# Patient Record
Sex: MALE | Race: WHITE | NOT HISPANIC OR LATINO | Employment: FULL TIME | URBAN - METROPOLITAN AREA
[De-identification: names, ages, dates, MRNs, and addresses within clinical notes are randomized per-mention and may not be internally consistent; named-entity substitution may affect disease eponyms.]

---

## 2019-11-08 ENCOUNTER — OFFICE VISIT (OUTPATIENT)
Dept: FAMILY MEDICINE CLINIC | Facility: CLINIC | Age: 24
End: 2019-11-08

## 2019-11-08 VITALS
BODY MASS INDEX: 32.1 KG/M2 | SYSTOLIC BLOOD PRESSURE: 120 MMHG | WEIGHT: 237 LBS | HEART RATE: 90 BPM | HEIGHT: 72 IN | OXYGEN SATURATION: 98 % | DIASTOLIC BLOOD PRESSURE: 80 MMHG

## 2019-11-08 DIAGNOSIS — R42 DIZZINESS: ICD-10-CM

## 2019-11-08 DIAGNOSIS — J30.9 ALLERGIC RHINITIS, UNSPECIFIED SEASONALITY, UNSPECIFIED TRIGGER: Primary | ICD-10-CM

## 2019-11-08 DIAGNOSIS — Z00.00 PREVENTATIVE HEALTH CARE: ICD-10-CM

## 2019-11-08 DIAGNOSIS — J34.89 RHINORRHEA: ICD-10-CM

## 2019-11-08 DIAGNOSIS — G44.52 NEW DAILY PERSISTENT HEADACHE: ICD-10-CM

## 2019-11-08 PROCEDURE — 99204 OFFICE O/P NEW MOD 45 MIN: CPT | Performed by: FAMILY MEDICINE

## 2019-11-08 RX ORDER — FLUTICASONE PROPIONATE 50 MCG
2 SPRAY, SUSPENSION (ML) NASAL DAILY
COMMUNITY

## 2019-11-08 RX ORDER — FEXOFENADINE HCL 180 MG/1
180 TABLET ORAL DAILY
COMMUNITY
End: 2019-11-22

## 2019-11-08 RX ORDER — MONTELUKAST SODIUM 10 MG/1
10 TABLET ORAL NIGHTLY
Qty: 30 TABLET | Refills: 2 | Status: SHIPPED | OUTPATIENT
Start: 2019-11-08 | End: 2020-02-12

## 2019-11-08 NOTE — PROGRESS NOTES
Subjective   Nicolas Cardoso is a 24 y.o. male.     Chief Complaint   Patient presents with   • Establish Care     chronic allergy concerns   • Nasal Congestion     clear yellow, dripping from nose   • Headache     extreme pressure    • Dizziness   • Memory Loss     short term        History of Present Illness     Acute complaints:  Nicolas Cardoso is a 24 y.o. male who presents today to establish care. Lived in [place] his whole life. [place], has been having worsening allergy symptoms as well as new symptoms. He is the cook on a boat. He has been having vertigo, memory loss, headache.  Last week he also had an episode where he leaned forward and he had a copious amount of clear discharge from just the left nostril.  This is happened to him on occasion before, most notably a few years ago, but on this occasion it did not stop for about a minute.    This patient is accompanied by their self who contributes to the history of their care.    The following portions of the patient's history were reviewed and updated as appropriate: allergies, current medications, past family history, past medical history, past social history, past surgical history and problem list.    Active Ambulatory Problems     Diagnosis Date Noted   • Allergic rhinitis 11/08/2019     Resolved Ambulatory Problems     Diagnosis Date Noted   • No Resolved Ambulatory Problems     Past Medical History:   Diagnosis Date   • Asthma    • Fractures    • GERD (gastroesophageal reflux disease)      History reviewed. No pertinent surgical history.  Family History   Problem Relation Age of Onset   • Diabetes Mother    • Diabetes Maternal Grandfather      Social History     Socioeconomic History   • Marital status: Single     Spouse name: Not on file   • Number of children: Not on file   • Years of education: Not on file   • Highest education level: Not on file   Tobacco Use   • Smoking status: Current Every Day Smoker     Types: Cigarettes   •  "Smokeless tobacco: Never Used   • Tobacco comment: 1-4 cigarettes daily    Substance and Sexual Activity   • Alcohol use: Yes     Comment: occasionally    • Drug use: No         Review of Systems   Constitutional: Negative.    HENT: Positive for congestion, ear pain, hearing loss and rhinorrhea.    Eyes: Negative.    Respiratory: Negative.    Cardiovascular: Negative.    Gastrointestinal: Negative.    Endocrine: Negative.    Genitourinary: Negative.    Musculoskeletal: Negative.    Neurological: Positive for dizziness and headaches.       Objective   Blood pressure 120/80, pulse 90, height 182.9 cm (72\"), weight 108 kg (237 lb), SpO2 98 %.  Nursing note reviewed  Physical Exam  Const: NAD, A&Ox4, Pleasant, Cooperative  Eyes: EOMI, no conjunctivitis  ENT: No nasal discharge present, neck supple  Cardiac: Regular rate and rhythm, no cyanosis  Resp: Respiratory rate within normal limits, no increased work of breathing, no audible wheezing or retractions noted  GI: No distention or ascites  MSK: Motor and sensation grossly intact in bilateral upper extremities  Neurologic:   Neurologic examination:  Mental status:  The patient is alert, attentive, and oriented. Speech is clear and fluent with good repetition, comprehension, and naming. He recalls 3/3 objects at 5 minutes.    ranial nerves:  CN II: Visual fields are full to confrontation. Fundoscopic exam is normal with sharp discs and no vascular changes. Venous pulsations are present bilaterally. Pupils are 4 mm and briskly reactive to light. Visual acuity is 20/20 bilaterally.    CN III, IV, VI: At primary gaze, there is no eye deviation.    CN V: Facial sensation is intact to pinprick in all 3 divisions bilaterally. Corneal responses are intact.    CN VII: Face is symmetric with normal eye closure and smile.    CN VII: Hearing is normal to rubbing fingers    CN IX, X: Palate elevates symmetrically. Phonation is normal.    CN XI: Head turning and shoulder shrug are " intact    CN XII: Tongue is midline with normal movements and no atrophy.    Motor:  There is no pronator drift of out-stretched arms. Muscle bulk and tone are normal. Strength is full bilaterally.      Deltoid Biceps Triceps Wrist Ext Finger abd Hip Flx Hip Ext  L 5 5 5  5  5  5 5      R 5 5 5  5  5  5 5      Knee Flx Knee Ext Ankle Flx Ankle Ext  L 5  5  5  5        R 5  5  5  5       Reflexes:  Reflexes are 2+ and symmetric at the biceps, triceps, knees, and ankles. Plantar responses are flexor.    Sensory:  Light touch, pinprick, position sense, and vibration sense are intact in fingers and toes.    Coordination:  Rapid alternating movements and fine finger movements are intact. There is no dysmetria on finger-to-nose and heel-knee-shin. There are no abnormal or extraneous movements. Romberg is absent.    Gait/Stance:  Posture is normal. Gait is steady with normal steps, base, arm swing, and turning. Heel and toe walking are normal. Tandem gait is normal when the patient closes one of her eyes.    Procedures  Assessment/Plan   Problem List Items Addressed This Visit        Respiratory    Allergic rhinitis - Primary    Relevant Medications    montelukast (SINGULAIR) 10 MG tablet    Other Relevant Orders    CBC & Differential    Comprehensive Metabolic Panel    C-reactive Protein    IgE    MRI Brain Without Contrast      Other Visit Diagnoses     Rhinorrhea        Relevant Medications    montelukast (SINGULAIR) 10 MG tablet    Other Relevant Orders    CBC & Differential    Comprehensive Metabolic Panel    C-reactive Protein    IgE    MRI Brain Without Contrast    Preventative health care        Relevant Orders    CBC & Differential    Comprehensive Metabolic Panel    Lipid Panel    C-reactive Protein    IgE    New daily persistent headache        Relevant Medications    montelukast (SINGULAIR) 10 MG tablet    Other Relevant Orders    MRI Brain Without Contrast    Dizziness        Relevant Orders    MRI Brain  Without Contrast          We will plan to obtain previous records both for chronic preventative care as well as those related to the current episode of care.  Any records that the patient brought with him today were reviewed personally by me during the visit today and will be scanned into the chart for posterity.    Patient Instructions   1.  Follow up after MRI    2.  Start SIngulair      Return in about 2 weeks (around 11/22/2019) for Annual.    Ambulatory progress note signed and attested to by Merlin Valverde D.O.

## 2019-11-18 ENCOUNTER — LAB (OUTPATIENT)
Dept: LAB | Facility: HOSPITAL | Age: 24
End: 2019-11-18

## 2019-11-18 ENCOUNTER — HOSPITAL ENCOUNTER (OUTPATIENT)
Dept: MRI IMAGING | Facility: HOSPITAL | Age: 24
Discharge: HOME | End: 2019-11-18
Admitting: FAMILY MEDICINE

## 2019-11-18 DIAGNOSIS — J34.89 RHINORRHEA: ICD-10-CM

## 2019-11-18 DIAGNOSIS — J30.9 ALLERGIC RHINITIS, UNSPECIFIED SEASONALITY, UNSPECIFIED TRIGGER: ICD-10-CM

## 2019-11-18 DIAGNOSIS — Z00.00 PREVENTATIVE HEALTH CARE: ICD-10-CM

## 2019-11-18 DIAGNOSIS — G44.52 NEW DAILY PERSISTENT HEADACHE: ICD-10-CM

## 2019-11-18 DIAGNOSIS — R42 DIZZINESS: ICD-10-CM

## 2019-11-18 LAB
ALBUMIN SERPL-MCNC: 4.6 G/DL (ref 3.5–5.2)
ALBUMIN/GLOB SERPL: 1.6 G/DL
ALP SERPL-CCNC: 67 U/L (ref 39–117)
ALT SERPL W P-5'-P-CCNC: 24 U/L (ref 1–41)
ANION GAP SERPL CALCULATED.3IONS-SCNC: 13.1 MMOL/L (ref 5–15)
AST SERPL-CCNC: 16 U/L (ref 1–40)
BASOPHILS # BLD AUTO: 0.05 10*3/MM3 (ref 0–0.2)
BASOPHILS NFR BLD AUTO: 1.1 % (ref 0–1.5)
BILIRUB SERPL-MCNC: 0.5 MG/DL (ref 0.2–1.2)
BUN BLD-MCNC: 13 MG/DL (ref 6–20)
BUN/CREAT SERPL: 14.1 (ref 7–25)
CALCIUM SPEC-SCNC: 9.5 MG/DL (ref 8.6–10.5)
CHLORIDE SERPL-SCNC: 104 MMOL/L (ref 98–107)
CHOLEST SERPL-MCNC: 173 MG/DL (ref 0–200)
CO2 SERPL-SCNC: 26.9 MMOL/L (ref 22–29)
CREAT BLD-MCNC: 0.92 MG/DL (ref 0.76–1.27)
CRP SERPL-MCNC: 0.28 MG/DL (ref 0–0.5)
DEPRECATED RDW RBC AUTO: 38.1 FL (ref 37–54)
EOSINOPHIL # BLD AUTO: 0.12 10*3/MM3 (ref 0–0.4)
EOSINOPHIL NFR BLD AUTO: 2.6 % (ref 0.3–6.2)
ERYTHROCYTE [DISTWIDTH] IN BLOOD BY AUTOMATED COUNT: 12.1 % (ref 12.3–15.4)
GFR SERPL CREATININE-BSD FRML MDRD: 99 ML/MIN/1.73
GLOBULIN UR ELPH-MCNC: 2.8 GM/DL
GLUCOSE BLD-MCNC: 93 MG/DL (ref 65–99)
HCT VFR BLD AUTO: 45.4 % (ref 37.5–51)
HDLC SERPL-MCNC: 62 MG/DL (ref 40–60)
HGB BLD-MCNC: 14.9 G/DL (ref 13–17.7)
IMM GRANULOCYTES # BLD AUTO: 0.02 10*3/MM3 (ref 0–0.05)
IMM GRANULOCYTES NFR BLD AUTO: 0.4 % (ref 0–0.5)
LDLC SERPL CALC-MCNC: 97 MG/DL (ref 0–100)
LDLC/HDLC SERPL: 1.57 {RATIO}
LYMPHOCYTES # BLD AUTO: 1.66 10*3/MM3 (ref 0.7–3.1)
LYMPHOCYTES NFR BLD AUTO: 35.3 % (ref 19.6–45.3)
MCH RBC QN AUTO: 28.1 PG (ref 26.6–33)
MCHC RBC AUTO-ENTMCNC: 32.8 G/DL (ref 31.5–35.7)
MCV RBC AUTO: 85.7 FL (ref 79–97)
MONOCYTES # BLD AUTO: 0.41 10*3/MM3 (ref 0.1–0.9)
MONOCYTES NFR BLD AUTO: 8.7 % (ref 5–12)
NEUTROPHILS # BLD AUTO: 2.44 10*3/MM3 (ref 1.7–7)
NEUTROPHILS NFR BLD AUTO: 51.9 % (ref 42.7–76)
NRBC BLD AUTO-RTO: 0 /100 WBC (ref 0–0.2)
PLATELET # BLD AUTO: 189 10*3/MM3 (ref 140–450)
PMV BLD AUTO: 10.8 FL (ref 6–12)
POTASSIUM BLD-SCNC: 4.2 MMOL/L (ref 3.5–5.2)
PROT SERPL-MCNC: 7.4 G/DL (ref 6–8.5)
RBC # BLD AUTO: 5.3 10*6/MM3 (ref 4.14–5.8)
SODIUM BLD-SCNC: 144 MMOL/L (ref 136–145)
TRIGL SERPL-MCNC: 68 MG/DL (ref 0–150)
VLDLC SERPL-MCNC: 13.6 MG/DL (ref 5–40)
WBC NRBC COR # BLD: 4.7 10*3/MM3 (ref 3.4–10.8)

## 2019-11-18 PROCEDURE — 80053 COMPREHEN METABOLIC PANEL: CPT

## 2019-11-18 PROCEDURE — 82785 ASSAY OF IGE: CPT

## 2019-11-18 PROCEDURE — 70551 MRI BRAIN STEM W/O DYE: CPT

## 2019-11-18 PROCEDURE — 85025 COMPLETE CBC W/AUTO DIFF WBC: CPT

## 2019-11-18 PROCEDURE — 80061 LIPID PANEL: CPT

## 2019-11-18 PROCEDURE — 86140 C-REACTIVE PROTEIN: CPT

## 2019-11-22 ENCOUNTER — OFFICE VISIT (OUTPATIENT)
Dept: FAMILY MEDICINE CLINIC | Facility: CLINIC | Age: 24
End: 2019-11-22

## 2019-11-22 VITALS
HEART RATE: 84 BPM | DIASTOLIC BLOOD PRESSURE: 88 MMHG | OXYGEN SATURATION: 99 % | HEIGHT: 72 IN | SYSTOLIC BLOOD PRESSURE: 120 MMHG | WEIGHT: 232.2 LBS | BODY MASS INDEX: 31.45 KG/M2

## 2019-11-22 DIAGNOSIS — J30.9 ALLERGIC RHINITIS, UNSPECIFIED SEASONALITY, UNSPECIFIED TRIGGER: ICD-10-CM

## 2019-11-22 DIAGNOSIS — Z23 NEEDS FLU SHOT: ICD-10-CM

## 2019-11-22 DIAGNOSIS — Z23 NEED FOR PNEUMOCOCCAL VACCINATION: ICD-10-CM

## 2019-11-22 DIAGNOSIS — Z00.00 PREVENTATIVE HEALTH CARE: Primary | ICD-10-CM

## 2019-11-22 DIAGNOSIS — Z87.891 PERSONAL HISTORY OF TOBACCO USE, PRESENTING HAZARDS TO HEALTH: ICD-10-CM

## 2019-11-22 PROCEDURE — 90686 IIV4 VACC NO PRSV 0.5 ML IM: CPT | Performed by: FAMILY MEDICINE

## 2019-11-22 PROCEDURE — 90732 PPSV23 VACC 2 YRS+ SUBQ/IM: CPT | Performed by: FAMILY MEDICINE

## 2019-11-22 PROCEDURE — 90472 IMMUNIZATION ADMIN EACH ADD: CPT | Performed by: FAMILY MEDICINE

## 2019-11-22 PROCEDURE — 99395 PREV VISIT EST AGE 18-39: CPT | Performed by: FAMILY MEDICINE

## 2019-11-22 PROCEDURE — 90471 IMMUNIZATION ADMIN: CPT | Performed by: FAMILY MEDICINE

## 2019-11-22 NOTE — PROGRESS NOTES
Subjective   Nicolas Cardoso is a 24 y.o. male.     Chief Complaint   Patient presents with   • Annual Exam       History of Present Illness     Nicolas Cardoso presents today for   Chief Complaint   Patient presents with   • Annual Exam     He establish care with us on 11/8/2019.  At that point mainly complained of chronic allergy concerns and to headache as well as possible memory loss.  He had an MRI which was unremarkable.  He also had labs completed earlier this week.  I also recommended that he start a combination of allergy medications including Singulair.  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  He does not get much physical activity outside of this but follows a generally healthy diet.  He has no other chronic health concerns.  He states that the Singulair has helped significantly, he has not had any recurrence of the major symptoms he had complained of.    This patient is accompanied by their self who contributes to the history of their care.    The following portions of the patient's history were reviewed and updated as appropriate: allergies, current medications, past family history, past medical history, past social history, past surgical history and problem list.    Active Ambulatory Problems     Diagnosis Date Noted   • Allergic rhinitis 11/08/2019   • Preventative health care 11/22/2019     Resolved Ambulatory Problems     Diagnosis Date Noted   • No Resolved Ambulatory Problems     Past Medical History:   Diagnosis Date   • Asthma    • Fractures    • GERD (gastroesophageal reflux disease)      History reviewed. No pertinent surgical history.  Family History   Problem Relation Age of Onset   • Diabetes Mother    • Diabetes Maternal Grandfather      Social History     Socioeconomic History   • Marital status: Single     Spouse name: Not on file   • Number of children: Not on file   • Years of education: Not on file   • Highest education level: Not on file   Tobacco Use   • Smoking status:  "Current Every Day Smoker     Types: Cigarettes   • Smokeless tobacco: Never Used   • Tobacco comment: 1-4 cigarettes daily    Substance and Sexual Activity   • Alcohol use: Yes     Comment: occasionally    • Drug use: No   Social History Narrative    He is unmarried but has a 9-month-old daughter with his significant other, she lives with them in [place].  [place] a [place] a boat.  He has been a longtime smoker but has been slowly weaning himself down.       Review of Systems  Review of Systems -  General ROS: negative for - chills, fever or night sweats  Cardiovascular ROS: no chest pain or dyspnea on exertion  Gastrointestinal ROS: no abdominal pain, change in bowel habits, or black or bloody stools  Genito-Urinary ROS: no dysuria, trouble voiding, or hematuria    Objective   Blood pressure 120/88, pulse 84, height 182.9 cm (72\"), weight 105 kg (232 lb 3.2 oz), SpO2 99 %.  Nursing note reviewed  Physical Exam  Const: NAD, A&Ox4, Pleasant, Cooperative  Eyes: EOMI, no conjunctivitis  ENT: No nasal discharge present, neck supple  Cardiac: Regular rate and rhythm, no cyanosis  Resp: Respiratory rate within normal limits, no increased work of breathing, no audible wheezing or retractions noted  GI: No distention or ascites  MSK: Motor and sensation grossly intact in bilateral upper extremities  Neurologic: CN II-XII grossly intact  Psych: Appropriate mood and behavior.  PHQ 2 negative  Skin: Warm, dry  Procedures  Assessment/Plan     Problem List Items Addressed This Visit        Respiratory    Allergic rhinitis       Other    Preventative health care - Primary      Other Visit Diagnoses     Personal history of tobacco use, presenting hazards to health        Needs flu shot        Relevant Orders    Fluarix/Fluzone/Afluria/FluLaval ([id number])    Need for pneumococcal vaccination        Relevant Orders    Pneumococcal Polysaccharide Vaccine 23-Valent (PPSV23) Greater Than or Equal To 3yo " Subcutaneous / IM        #1 well adult exam  He had a lipid panel, metabolic profile, and blood count completed earlier this week which were all unremarkable.  Blood pressure is adequate.  He does not engage in high-risk sexual behavior at this time.  He is a current everyday smoker and had a small number of 1 to 4 cigarettes daily, and has been slowly weaning down from his prior heavier smoking days.  He was counseled regarding his smoking status and recommendations for flu vaccine and pneumococcal vaccine.  He assents to these today.  The preventative exam has been reviewed in detail.  The patient has been fully counseled on preventative guidelines for vaccines, cancer screenings, and other health maintenance needs. The patient was counseled on maintaining a lifestyle to promote good health and to minimize chronic diseases.  The patient has been assisted with scheduling healthcare procedures for the coming year and given a written document outlining these recommendations. Age-appropriate screening measures have been ordered for the patient today as indicated above.    #2 chronic allergic rhinitis  Continue daily Flonase and Singulair  No eosinophilia on recent labs    #3 GERD  He takes as needed medications over-the-counter    There are no Patient Instructions on file for this visit.    Return in about 1 year (around 11/22/2020) for Annual.    Ambulatory progress note signed and attested to by Merlin Valverde D.O.

## 2019-11-24 LAB — TOTAL IGE SMQN RAST: 51 IU/ML (ref 6–495)

## 2020-02-12 DIAGNOSIS — J34.89 RHINORRHEA: ICD-10-CM

## 2020-02-12 DIAGNOSIS — J30.9 ALLERGIC RHINITIS, UNSPECIFIED SEASONALITY, UNSPECIFIED TRIGGER: ICD-10-CM

## 2020-02-12 DIAGNOSIS — G44.52 NEW DAILY PERSISTENT HEADACHE: ICD-10-CM

## 2020-02-12 RX ORDER — MONTELUKAST SODIUM 10 MG/1
TABLET ORAL
Qty: 30 TABLET | Refills: 2 | Status: SHIPPED | OUTPATIENT
Start: 2020-02-12 | End: 2020-05-08 | Stop reason: SDUPTHER

## 2020-03-11 ENCOUNTER — TELEPHONE (OUTPATIENT)
Dept: FAMILY MEDICINE CLINIC | Facility: CLINIC | Age: 25
End: 2020-03-11

## 2020-03-11 DIAGNOSIS — J30.9 ALLERGIC RHINITIS, UNSPECIFIED SEASONALITY, UNSPECIFIED TRIGGER: Primary | ICD-10-CM

## 2020-03-11 RX ORDER — ALBUTEROL SULFATE 90 UG/1
2 AEROSOL, METERED RESPIRATORY (INHALATION) EVERY 4 HOURS PRN
Qty: 8 G | Refills: 1 | Status: SHIPPED | OUTPATIENT
Start: 2020-03-11

## 2020-03-11 NOTE — TELEPHONE ENCOUNTER
PT CALLED IN STATED HE HAS SEASONAL ALLERGIES AND HAS USED AN INHALER IN THE PAST AND IS REQUESTING ANOTHER ONE BE ISSUED FOR HIM PLEASE ADVISE      PT CB NUMBER: [phone]  [facility] [place]  FAX# [phone]

## 2020-05-06 ENCOUNTER — TELEPHONE (OUTPATIENT)
Dept: FAMILY MEDICINE CLINIC | Facility: CLINIC | Age: 25
End: 2020-05-06

## 2020-05-06 DIAGNOSIS — J34.89 RHINORRHEA: ICD-10-CM

## 2020-05-06 DIAGNOSIS — G44.52 NEW DAILY PERSISTENT HEADACHE: ICD-10-CM

## 2020-05-06 DIAGNOSIS — J30.9 ALLERGIC RHINITIS, UNSPECIFIED SEASONALITY, UNSPECIFIED TRIGGER: ICD-10-CM

## 2020-05-06 NOTE — TELEPHONE ENCOUNTER
Patient is calling stating that he tried to refill his sinulair and they are on back order. Patient stated that his pharmacy recommended him to call his PCP to see about getting it changed to something different. Please advise and call back    Confirmed pharmacy    Callback     [phone]

## 2020-05-07 NOTE — TELEPHONE ENCOUNTER
Left Message for Pt to return our call     Ok for [facility]UB to relay message and find out what other Pharmacy he would like to use.

## 2020-05-07 NOTE — TELEPHONE ENCOUNTER
There doesn't appear to be an actual shortage of singulair as far as I know, might need to send to a different pharmacy. Would rather that than change med.

## 2020-05-08 RX ORDER — MONTELUKAST SODIUM 10 MG/1
10 TABLET ORAL
Qty: 30 TABLET | Refills: 2 | Status: SHIPPED | OUTPATIENT
Start: 2020-05-08 | End: 2020-10-26

## 2020-05-08 NOTE — TELEPHONE ENCOUNTER
Contacted patient he stated that they were out of the generic Singulair but they have montelukast available and requested we send an [facility]

## 2020-05-08 NOTE — TELEPHONE ENCOUNTER
Attempted to contact patient, no answer LVM stating we can forward his prescription to [facility], if it currently backordered

## 2020-06-25 ENCOUNTER — TELEPHONE (OUTPATIENT)
Dept: FAMILY MEDICINE CLINIC | Facility: CLINIC | Age: 25
End: 2020-06-25

## 2020-06-25 NOTE — TELEPHONE ENCOUNTER
Contacted patient, he states that he has been scratched fairly recently by his cat. I advised him to be evaluated to make sure it does not become more infectious  He scheduled an appt 6/25

## 2020-06-25 NOTE — TELEPHONE ENCOUNTER
PT CALLED AND STATED THAT HIS CAT HAS CAT SCRATCH FEVER - PT IS CONCERNED.  HE STATES THAT IS HAS HAS SOME BUMPS ON HIS LEGS THAT HAS BEEN COMING AND GOING .  PATIENT WOULD LIKE A PROFESSIONAL OPINION FOR HE AND HIS FAMILY, HE ALSO HAS A TODDLER AT HOME    PT CALL [phone]

## 2020-06-26 ENCOUNTER — OFFICE VISIT (OUTPATIENT)
Dept: FAMILY MEDICINE CLINIC | Facility: CLINIC | Age: 25
End: 2020-06-26

## 2020-06-26 VITALS
WEIGHT: 214 LBS | HEIGHT: 72 IN | TEMPERATURE: 98.3 F | OXYGEN SATURATION: 99 % | SYSTOLIC BLOOD PRESSURE: 116 MMHG | HEART RATE: 80 BPM | BODY MASS INDEX: 28.99 KG/M2 | DIASTOLIC BLOOD PRESSURE: 72 MMHG

## 2020-06-26 DIAGNOSIS — L02.429 FURUNCLE OF EXTREMITY: Primary | ICD-10-CM

## 2020-06-26 PROCEDURE — 99214 OFFICE O/P EST MOD 30 MIN: CPT | Performed by: FAMILY MEDICINE

## 2020-06-26 NOTE — PROGRESS NOTES
Subjective   Nicolas Cardoso is a 25 y.o. male.     Chief Complaint   Patient presents with   • Possible exposure to Bartonella       History of Present Illness     Nicolas Cardoso presents today for   Chief Complaint   Patient presents with   • Possible exposure to Bartonella     He states that his cat was not sneezing and he took it to the vet and were told that he had antibodies to Bartonella.  He has no scratches and has not sustained any wounds, denies any lymphadenopathy.  He has some boils on his legs that are chronic and recurrent.    This patient is accompanied by their self who contributes to the history of their care.    The following portions of the patient's history were reviewed and updated as appropriate: allergies, current medications, past family history, past medical history, past social history, past surgical history and problem list.    Active Ambulatory Problems     Diagnosis Date Noted   • Allergic rhinitis 11/08/2019   • Preventative health care 11/22/2019     Resolved Ambulatory Problems     Diagnosis Date Noted   • No Resolved Ambulatory Problems     Past Medical History:   Diagnosis Date   • Asthma    • Fractures    • GERD (gastroesophageal reflux disease)      No past surgical history on file.  Family History   Problem Relation Age of Onset   • Diabetes Mother    • Diabetes Maternal Grandfather      Social History     Socioeconomic History   • Marital status: Single     Spouse name: Not on file   • Number of children: Not on file   • Years of education: Not on file   • Highest education level: Not on file   Tobacco Use   • Smoking status: Current Every Day Smoker     Types: Cigarettes   • Smokeless tobacco: Never Used   • Tobacco comment: 1-4 cigarettes daily    Substance and Sexual Activity   • Alcohol use: Yes     Comment: occasionally    • Drug use: No   Social History Narrative    He is unmarried but has a 9-month-old daughter with his significant other, she lives with them in [place].  [place] a [place]  He has been a longtime smoker but has been slowly weaning himself down.       Review of Systems  Review of Systems -  General ROS: negative for - chills, fever or night sweats  Cardiovascular ROS: no chest pain or dyspnea on exertion  Gastrointestinal ROS: no abdominal pain, change in bowel habits, or black or bloody stools  Genito-Urinary ROS: no trouble voiding or gross hematuria    Objective   Blood pressure 116/72, pulse 80, temperature 98.3 °F (36.8 °C), temperature source Oral, height 182.9 cm (72\"), weight 97.1 kg (214 lb), SpO2 99 %.  Nursing note reviewed  Physical Exam  Const: NAD, A&Ox4, Pleasant, Cooperative  Eyes: EOMI, no conjunctivitis  ENT: No nasal discharge present, neck supple  Cardiac: Regular rate and rhythm, no cyanosis  Resp: Respiratory rate within normal limits, no increased work of breathing, no audible wheezing or retractions noted  GI: No distention or ascites  Procedures  Assessment/Plan     Problem List Items Addressed This Visit     None      Visit Diagnoses     Furuncle of extremity    -  Primary    Relevant Medications    Chlorhexidine Gluconate 4 % solution    Other Relevant Orders    Ambulatory Referral to Dermatology        I have spent 25 minutes face-to-face with 15 minutes spent counseling the patient on the diagnoses, possible treatment options and outcomes, adjunctive and alternative treatments, and developing a care plan.  He was counseled extensively on the risk of Bartonella infection and prevalence in feline population including antibodies.  No current indication for any treatment.    See patient diagnoses and orders along with patient instructions for assessment, plan, and changes to care for patient.    There are no Patient Instructions on file for this visit.    No follow-ups on file.    Ambulatory progress note signed and attested to by Merlin Valverde D.O.         "

## 2020-09-09 ENCOUNTER — TELEMEDICINE (OUTPATIENT)
Dept: FAMILY MEDICINE CLINIC | Facility: CLINIC | Age: 25
End: 2020-09-09

## 2020-09-09 DIAGNOSIS — F17.200 TOBACCO DEPENDENCE: ICD-10-CM

## 2020-09-09 DIAGNOSIS — J30.9 ALLERGIC RHINITIS WITH POSTNASAL DRIP: Primary | ICD-10-CM

## 2020-09-09 DIAGNOSIS — R09.82 ALLERGIC RHINITIS WITH POSTNASAL DRIP: Primary | ICD-10-CM

## 2020-09-09 DIAGNOSIS — J02.9 PHARYNGITIS, UNSPECIFIED ETIOLOGY: ICD-10-CM

## 2020-09-09 PROCEDURE — 99213 OFFICE O/P EST LOW 20 MIN: CPT | Performed by: PHYSICIAN ASSISTANT

## 2020-09-09 RX ORDER — VARENICLINE TARTRATE 1 MG/1
1 TABLET, FILM COATED ORAL 2 TIMES DAILY
Qty: 60 TABLET | Refills: 1 | Status: SHIPPED | OUTPATIENT
Start: 2020-09-09

## 2020-09-09 NOTE — PROGRESS NOTES
"    Chief Complaint   Patient presents with   • Sore Throat       HPI     Nicolas Cardoso is a pleasant 25 y.o. 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 Had sinus drainage and sore throat. He could see blisters in the back of his throat and thinks this could be related to drainage. Soreness symptoms improved with warm salt water gargles. Has tried to quit smoking, struggled over past year.  Interested in assistance.    Past Medical History:   Diagnosis Date   • Asthma    • Fractures    • GERD (gastroesophageal reflux disease)        History reviewed. No pertinent surgical history.    Family History   Problem Relation Age of Onset   • Diabetes Mother    • Diabetes Maternal Grandfather        Social History     Socioeconomic History   • Marital status: Single     Spouse name: Not on file   • Number of children: Not on file   • Years of education: Not on file   • Highest education level: Not on file   Tobacco Use   • Smoking status: Current Every Day Smoker     Types: Cigarettes   • Smokeless tobacco: Never Used   • Tobacco comment: 1-4 cigarettes daily    Substance and Sexual Activity   • Alcohol use: Yes     Comment: occasionally    • Drug use: No   Social History Narrative    He is unmarried but has a 9-month-old daughter with his significant other, she lives with them in [place].  [place] a [place] a [place].  He has been a longtime smoker but has been slowly weaning himself down.       Allergies   Allergen Reactions   • Penicillins Hives       ROS    Review of Systems   Constitutional: Negative for chills and fever.   HENT: Positive for postnasal drip and sore throat. Negative for swollen glands.    Respiratory: Negative for cough, shortness of breath and wheezing.    Cardiovascular: Negative for chest pain.   Hematological: Negative for adenopathy.       There were no vitals " filed for this visit.  There is no height or weight on file to calculate BMI.      Current Outpatient Medications:   •  albuterol sulfate  (90 Base) MCG/ACT inhaler, Inhale 2 puffs Every 4 (Four) Hours As Needed for Wheezing or Shortness of Air., Disp: 8 g, Rfl: 1  •  Chlorhexidine Gluconate 4 % solution, Use to cleanse skin once weekly to reduce cutaneous infections, Disp: 473 mL, Rfl: 5  •  fluticasone (FLONASE) 50 MCG/ACT nasal spray, 2 sprays into the nostril(s) as directed by provider Daily., Disp: , Rfl:   •  montelukast (SINGULAIR) 10 MG tablet, Take 1 tablet by mouth every night at bedtime., Disp: 30 tablet, Rfl: 2  •  varenicline (Chantix Continuing Month Darian) 1 MG tablet, Take 1 tablet by mouth 2 (Two) Times a Day., Disp: 60 tablet, Rfl: 1  •  varenicline (Chantix Starting Month Darian) 0.5 MG X 11 & 1 MG X 42 tablet, Take 0.5 mg one daily on days 1-3 and and 0.5 mg twice daily on days 4-7. Then 1 mg twice daily for a total of 12 weeks., Disp: 53 tablet, Rfl: 0    PE    Physical Exam   Constitutional: He appears well-developed and well-nourished. No distress.   HENT:   Unable to visualize posterior pharynx as patient shines light on his phone. Several small mucosal colored papules consistent with cobblestoning   Pulmonary/Chest: Effort normal. No respiratory distress.   Neurological: He is alert.   Psychiatric: He has a normal mood and affect.   Vitals reviewed.       A/P    Problem List Items Addressed This Visit     None      Visit Diagnoses     Allergic rhinitis with postnasal drip    -  Primary  -Continue Allegra, Flonase, Singulair      Pharyngitis, unspecified etiology      -Suspect secondary to postnasal drip  -Improving with conservative treatment  -Encouraged warm salt water gargles  -If patient's pain and areas of concern persist, consider ENT referral.       Tobacco dependence      -Prescription for Chantix after discussion  -Keep planned follow-up with Dr. Valverde in November Relevant  Medications    varenicline (Chantix Continuing Month Darian) 1 MG tablet    varenicline (Chantix Starting Month Darian) 0.5 MG X 11 & 1 MG X 42 tablet          Plan of care was reviewed with patient at the conclusion of today's visit. Education was provided regarding diagnoses, management, prescribed or recommended OTC products, and the importance of compliance with follow-up appointments. The patient was counseled regarding the risks, benefits, and possible side-effects of treatment. I advised the patient to keep me informed of any acute changes in their status including new, worsening, or persistent symptoms. Patient expresses understanding and agreement with the management plan.        KIMBER Jang

## 2020-09-30 ENCOUNTER — OFFICE VISIT (OUTPATIENT)
Dept: FAMILY MEDICINE CLINIC | Facility: CLINIC | Age: 25
End: 2020-09-30

## 2020-09-30 VITALS
WEIGHT: 214 LBS | SYSTOLIC BLOOD PRESSURE: 122 MMHG | DIASTOLIC BLOOD PRESSURE: 76 MMHG | HEIGHT: 72 IN | HEART RATE: 81 BPM | BODY MASS INDEX: 28.99 KG/M2

## 2020-09-30 DIAGNOSIS — Z87.891 PERSONAL HISTORY OF TOBACCO USE, PRESENTING HAZARDS TO HEALTH: ICD-10-CM

## 2020-09-30 DIAGNOSIS — S10.12XA: Primary | ICD-10-CM
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
## 2020-09-30 PROCEDURE — 99214 OFFICE O/P EST MOD 30 MIN: CPT | Performed by: FAMILY MEDICINE

## 2020-09-30 RX ORDER — AZITHROMYCIN 250 MG/1
TABLET, FILM COATED ORAL
Qty: 6 TABLET | Refills: 0 | Status: SHIPPED | OUTPATIENT
Start: 2020-09-30 | End: 2020-10-06

## 2020-09-30 RX ORDER — NICOTINE 21 MG/24HR
1 PATCH, TRANSDERMAL 24 HOURS TRANSDERMAL EVERY 24 HOURS
Qty: 28 EACH | Refills: 2 | Status: SHIPPED | OUTPATIENT
Start: 2020-09-30

## 2020-09-30 NOTE — PROGRESS NOTES
Subjective   Nicolas Cardoso is a 25 y.o. male.     Chief Complaint   Patient presents with   • Sore Throat       History of Present Illness     Nicolas Cardoso presents today for   Chief Complaint   Patient presents with   • Sore Throat     History of smoking 8-10 years. Concerned for malignancy. Prescribed chantix at last visit telehealth, has not started.    Answers for HPI/ROS submitted by the patient on 9/27/2020   What is the primary reason for your visit?: Other  Please describe your symptoms.: I have had blisters in the back of my throat and on and off sore throat For a few weeks. It seems to align with my allergies and sinus drainage. They have seemed to shrink somewhat but have not completely disappeared since I first noticed them  Have you had these symptoms before?: No  How long have you been having these symptoms?: Greater than 2 weeks  Please list any medications you are currently taking for this condition.: Sodium Monelukast, Allegra D as needed , Flonase nasal spray, Albuterol Inhaler as needed  Please describe any probable cause for these symptoms. : Seems to align with bouts of sinus drainage , Concerns of possible cancer    This patient is accompanied by their self who contributes to the history of their care.    The following portions of the patient's history were reviewed and updated as appropriate: allergies, current medications, past family history, past medical history, past social history, past surgical history and problem list.    Active Ambulatory Problems     Diagnosis Date Noted   • Allergic rhinitis 11/08/2019   • Preventative health care 11/22/2019     Resolved Ambulatory Problems     Diagnosis Date Noted   • No Resolved Ambulatory Problems     Past Medical History:   Diagnosis Date   • Asthma    • Fractures    • GERD (gastroesophageal reflux disease)      History reviewed. No pertinent surgical history.  Family History   Problem Relation Age of Onset   • Diabetes Mother    • Diabetes  "Maternal Grandfather      Social History     Socioeconomic History   • Marital status: Single     Spouse name: Not on file   • Number of children: Not on file   • Years of education: Not on file   • Highest education level: Not on file   Tobacco Use   • Smoking status: Current Every Day Smoker     Types: Cigarettes   • Smokeless tobacco: Never Used   • Tobacco comment: 1-4 cigarettes daily    Substance and Sexual Activity   • Alcohol use: Yes     Comment: occasionally    • Drug use: No   Social History Narrative    He is unmarried but has a 9-month-old daughter with his significant other, she lives with them in [place].  [place].  He has been a longtime smoker but has been slowly weaning himself down.       Review of Systems  Review of Systems -  General ROS: negative for - chills, fever or night sweats  Cardiovascular ROS: no chest pain or dyspnea on exertion  Gastrointestinal ROS: no abdominal pain, change in bowel habits, or black or bloody stools  Genito-Urinary ROS: no dysuria, trouble voiding, or hematuria    Objective   Blood pressure 122/76, pulse 81, height 182.9 cm (72\"), weight 97.1 kg (214 lb).  Nursing note reviewed  Physical Exam  Const: NAD, A&Ox4, Pleasant, Cooperative  Eyes: EOMI, no conjunctivitis  ENT: 5 small 1-3mm vesicular lesions of posterior pharynx, no purulent drainage  Cardiac: Regular rate and rhythm, no cyanosis  Resp: Respiratory rate within normal limits, no increased work of breathing, no audible wheezing or retractions noted  Procedures  Assessment/Plan   Problem List Items Addressed This Visit     None      Visit Diagnoses     Blister (nonthermal) of throat, initial encounter    -  Primary    Relevant Medications    azithromycin (Zithromax Z-Darian) 250 MG tablet    Other Relevant Orders    Ambulatory Referral to ENT (Otolaryngology)    Personal history of tobacco use, presenting hazards to health        Chantix prescribed, patches today for when " working (max 11-13 cig)    Relevant Medications    nicotine (Nicoderm CQ) 14 MG/24HR patch    Exposure to Bartonella        Positive feline Ab to Bartonella, discussed in June. With his symptoms, abx are reasonable. Azithromycin sent    Relevant Medications    azithromycin (Zithromax Z-Darian) 250 MG tablet          See patient diagnoses and orders along with patient instructions for assessment, plan, and changes to care for patient.    Patient Instructions   1.  Start Chantix    2.  Continue allergy medication    3.  Referral to ENT placed today      Return if symptoms worsen or fail to improve.    Ambulatory progress note signed and attested to by Merlin Valverde D.O.

## 2020-10-06 ENCOUNTER — TELEMEDICINE (OUTPATIENT)
Dept: FAMILY MEDICINE CLINIC | Facility: CLINIC | Age: 25
End: 2020-10-06

## 2020-10-06 DIAGNOSIS — R09.82 ALLERGIC RHINITIS WITH POSTNASAL DRIP: ICD-10-CM
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
## 2020-10-06 DIAGNOSIS — Z87.891 PERSONAL HISTORY OF TOBACCO USE, PRESENTING HAZARDS TO HEALTH: ICD-10-CM

## 2020-10-06 DIAGNOSIS — J30.9 ALLERGIC RHINITIS WITH POSTNASAL DRIP: ICD-10-CM

## 2020-10-06 DIAGNOSIS — S10.12XA: Primary | ICD-10-CM

## 2020-10-06 PROCEDURE — 99213 OFFICE O/P EST LOW 20 MIN: CPT | Performed by: FAMILY MEDICINE

## 2020-10-06 NOTE — PROGRESS NOTES
Subjective   Nicolas Cardoso is a 25 y.o. male.     Chief Complaint   Patient presents with   • Follow-up   • Allergies   • Sore Throat       History of Present Illness     Nicolas Cardoso presents today for   Chief Complaint   Patient presents with   • Follow-up   • Allergies   • Sore Throat     Blisters are doing better after antibiotic. Allergies are chronic and stable, slightly improved.    You have chosen to receive care through a telehealth visit.  Do you consent to use a video/audio connection for your medical care today? Yes    The following portions of the patient's history were reviewed and updated as appropriate: allergies, current medications, past family history, past medical history, past social history, past surgical history and problem list.    Active Ambulatory Problems     Diagnosis Date Noted   • Allergic rhinitis 11/08/2019   • Preventative health care 11/22/2019     Resolved Ambulatory Problems     Diagnosis Date Noted   • No Resolved Ambulatory Problems     Past Medical History:   Diagnosis Date   • Asthma    • Fractures    • GERD (gastroesophageal reflux disease)      No past surgical history on file.  Family History   Problem Relation Age of Onset   • Diabetes Mother    • Diabetes Maternal Grandfather      Social History     Socioeconomic History   • Marital status: Single     Spouse name: Not on file   • Number of children: Not on file   • Years of education: Not on file   • Highest education level: Not on file   Tobacco Use   • Smoking status: Current Every Day Smoker     Types: Cigarettes   • Smokeless tobacco: Never Used   • Tobacco comment: 1-4 cigarettes daily    Substance and Sexual Activity   • Alcohol use: Yes     Comment: occasionally    • Drug use: No   Social History Narrative    He is unmarried but has a 9-month-old daughter with his significant other, she lives with them in [place].  [place] a [place].  He has been a longtime smoker but has been slowly  weaning himself down.       Review of Systems  Review of Systems -  General ROS: negative for - chills, fever or night sweats  Cardiovascular ROS: no chest pain or dyspnea on exertion  Gastrointestinal ROS: no abdominal pain, change in bowel habits, or black or bloody stools  Genito-Urinary ROS: no dysuria, trouble voiding, or hematuria    Objective   There were no vitals taken for this visit.  Vitals obtained from patient if available  Physical Exam  Const: Non-toxic appearing, NAD, A&Ox4, Pleasant, Cooperative  Eyes: EOMI, no conjunctivitis  ENT: No copious nasal drainage noted  Cardiac: Regular rate by pulse  Resp: Respiratory rate observed to be within normal limits, no increased work of breathing observed, no audible wheezing or cough noted  Psych: Appropriate mood and behavior.  Procedures  Assessment/Plan   Problem List Items Addressed This Visit     None      Visit Diagnoses     Blister (nonthermal) of throat, initial encounter    -  Primary    Improving after Z-pack, has appt with ENT scheduled Friday but may reschedule if continuing to resolve    Exposure to Bartonella        Allergic rhinitis with postnasal drip        Continue Singulair, albuterol prn    Personal history of tobacco use, presenting hazards to health        Has not started chantix yet bc pharmacy has been out, but plans to do so soon          See patient diagnoses and orders along with patient instructions for assessment, plan, and changes to care for patient.    This visit was conducted via telemedicine with live video and audio provided through Video Options: MyChart/Zoom at the point of care.    There are no Patient Instructions on file for this visit.    Return if symptoms worsen or fail to improve.    Ambulatory progress note signed and attested to by Merlin Valverde D.O.

## 2020-10-25 DIAGNOSIS — J30.9 ALLERGIC RHINITIS, UNSPECIFIED SEASONALITY, UNSPECIFIED TRIGGER: ICD-10-CM

## 2020-10-25 DIAGNOSIS — G44.52 NEW DAILY PERSISTENT HEADACHE: ICD-10-CM

## 2020-10-25 DIAGNOSIS — J34.89 RHINORRHEA: ICD-10-CM

## 2020-10-27 RX ORDER — MONTELUKAST SODIUM 10 MG/1
TABLET ORAL
Qty: 90 TABLET | Refills: 1 | Status: SHIPPED | OUTPATIENT
Start: 2020-10-27

## 2024-05-06 ENCOUNTER — TELEPHONE (OUTPATIENT)
Dept: SURGERY | Facility: CLINIC | Age: 29
End: 2024-05-06

## 2024-05-06 NOTE — TELEPHONE ENCOUNTER
SAME DAY CANCEL APPT  Caller: IRMA FRANCIS    Relationship to patient: SELF  [phone] (home)       Patient is needing: SAME DAY CANCEL APPT-       HUB R/S FOR 5.10.24

## 2024-05-10 ENCOUNTER — TELEPHONE (OUTPATIENT)
Dept: SURGERY | Facility: CLINIC | Age: 29
End: 2024-05-10